# Patient Record
Sex: MALE | NOT HISPANIC OR LATINO | Employment: OTHER | URBAN - METROPOLITAN AREA
[De-identification: names, ages, dates, MRNs, and addresses within clinical notes are randomized per-mention and may not be internally consistent; named-entity substitution may affect disease eponyms.]

---

## 2024-05-07 PROBLEM — I65.29 CAROTID ARTERY STENOSIS: Status: ACTIVE | Noted: 2024-05-07

## 2024-05-31 ENCOUNTER — HOSPITAL ENCOUNTER (OUTPATIENT)
Facility: HOSPITAL | Age: 79
Discharge: HOME | End: 2024-05-31
Payer: MEDICARE

## 2024-05-31 ENCOUNTER — OFFICE VISIT (OUTPATIENT)
Age: 79
End: 2024-05-31
Payer: MEDICARE

## 2024-05-31 VITALS
HEIGHT: 71 IN | SYSTOLIC BLOOD PRESSURE: 142 MMHG | DIASTOLIC BLOOD PRESSURE: 66 MMHG | WEIGHT: 203 LBS | BODY MASS INDEX: 28.42 KG/M2

## 2024-05-31 DIAGNOSIS — I65.23 BILATERAL CAROTID ARTERY STENOSIS: Primary | ICD-10-CM

## 2024-05-31 DIAGNOSIS — I65.23 CAROTID STENOSIS, BILATERAL: ICD-10-CM

## 2024-05-31 DIAGNOSIS — I65.23 BILATERAL CAROTID ARTERY STENOSIS: ICD-10-CM

## 2024-05-31 LAB
BH CV XLRA MEAS LEFT CAROTID BULB EDV: 36.4 CM/SEC
BH CV XLRA MEAS LEFT CAROTID BULB PSV: 142.1 CM/SEC
BH CV XLRA MEAS LEFT DIST CCA EDV: -32.9 CM/SEC
BH CV XLRA MEAS LEFT DIST CCA PSV: -134.3 CM/SEC
BH CV XLRA MEAS LEFT DIST ICA EDV: -39.2 CM/SEC
BH CV XLRA MEAS LEFT DIST ICA PSV: -112.9 CM/SEC
BH CV XLRA MEAS LEFT ICA/CCA RATIO: 1.08
BH CV XLRA MEAS LEFT MID CCA EDV: 34.7 CM/SEC
BH CV XLRA MEAS LEFT MID CCA PSV: 143 CM/SEC
BH CV XLRA MEAS LEFT MID ICA EDV: -41.5 CM/SEC
BH CV XLRA MEAS LEFT MID ICA PSV: -145 CM/SEC
BH CV XLRA MEAS LEFT PROX CCA EDV: 34.7 CM/SEC
BH CV XLRA MEAS LEFT PROX CCA PSV: 157.7 CM/SEC
BH CV XLRA MEAS LEFT PROX ECA EDV: -21.7 CM/SEC
BH CV XLRA MEAS LEFT PROX ECA PSV: -109.2 CM/SEC
BH CV XLRA MEAS LEFT PROX ICA EDV: -23.5 CM/SEC
BH CV XLRA MEAS LEFT PROX ICA PSV: -99.6 CM/SEC
BH CV XLRA MEAS LEFT PROX SCLA PSV: 222.3 CM/SEC
BH CV XLRA MEAS LEFT VERTEBRAL A EDV: -14.9 CM/SEC
BH CV XLRA MEAS LEFT VERTEBRAL A PSV: -54.1 CM/SEC
BH CV XLRA MEAS RIGHT CAROTID BULB EDV: -42.8 CM/SEC
BH CV XLRA MEAS RIGHT CAROTID BULB PSV: -180 CM/SEC
BH CV XLRA MEAS RIGHT DIST CCA EDV: 36.6 CM/SEC
BH CV XLRA MEAS RIGHT DIST CCA PSV: 170.7 CM/SEC
BH CV XLRA MEAS RIGHT DIST ICA EDV: -37.1 CM/SEC
BH CV XLRA MEAS RIGHT DIST ICA PSV: -111.4 CM/SEC
BH CV XLRA MEAS RIGHT ICA/CCA RATIO: 1.05
BH CV XLRA MEAS RIGHT MID CCA EDV: 32.9 CM/SEC
BH CV XLRA MEAS RIGHT MID CCA PSV: 149.1 CM/SEC
BH CV XLRA MEAS RIGHT MID ICA EDV: -32.9 CM/SEC
BH CV XLRA MEAS RIGHT MID ICA PSV: -104.4 CM/SEC
BH CV XLRA MEAS RIGHT PROX CCA EDV: 29 CM/SEC
BH CV XLRA MEAS RIGHT PROX CCA PSV: 131.7 CM/SEC
BH CV XLRA MEAS RIGHT PROX ECA EDV: -14.9 CM/SEC
BH CV XLRA MEAS RIGHT PROX ECA PSV: -118.4 CM/SEC
BH CV XLRA MEAS RIGHT PROX ICA EDV: -39.5 CM/SEC
BH CV XLRA MEAS RIGHT PROX ICA PSV: -158 CM/SEC
BH CV XLRA MEAS RIGHT PROX SCLA PSV: 110.9 CM/SEC
BH CV XLRA MEAS RIGHT VERTEBRAL A EDV: -22.1 CM/SEC
BH CV XLRA MEAS RIGHT VERTEBRAL A PSV: -84.5 CM/SEC
LEFT ARM BP: NORMAL MMHG
RIGHT ARM BP: NORMAL MMHG

## 2024-05-31 PROCEDURE — 93880 EXTRACRANIAL BILAT STUDY: CPT

## 2024-05-31 RX ORDER — IRBESARTAN 300 MG/1
TABLET ORAL
COMMUNITY
Start: 2024-05-28

## 2024-05-31 RX ORDER — AMLODIPINE BESYLATE 2.5 MG/1
2.5 TABLET ORAL DAILY
COMMUNITY
Start: 2024-03-15

## 2024-05-31 RX ORDER — UBIDECARENONE 100 MG
200 CAPSULE ORAL DAILY
COMMUNITY

## 2024-05-31 NOTE — PATIENT INSTRUCTIONS
"\"0-5-3-Almost None!\"  Healthy Habits Start Early    EAT 5 OR MORE SERVINGS OF VEGETABLES AND FRUITS EVERY DAY.    Help Te get three vegetables and two fruits each day. Red, green, yellow, orange...encourage them to try all the colors so they can enjoy different flavors and get more vitamins.    How can I help Te do this?  ---------------------------------------------  -BE PATIENT WITH Te, remember it may take 10 times before they start to like new food. So, start with small bites and just keep trying.  -Serve at least one vegetable or fruit at every meal. Even try two. Remember, portions do not have to be as big as you think.  -Encourage eating fruits and vegetables instead of drinking them..it's a better way to get fiber and vitamins..so limit the amount of juice to 1/2 cup per day for children 1-6 years and one cup per day for children 7-18 years of age. Try using 1/2 part water and 1/2 part juice.    Spend less than two hours per day watching television and other screen media. Screen media includes video games, movies and computer use for entertainment.    How can I help Te do this?  -Turn off the TV at dinner. Dinner is the best time to hang out with your kids and just talk, learn about their day, and tell them about your day. Your kids have a lot to learn from you and dinner is a great time to share.  "

## 2024-05-31 NOTE — PROGRESS NOTES
Chief Complaint  Carotid Artery Disease    Subjective        Te James presents to [facility] VASCULAR SURGERY  HPI   Te James is a 78 y.o. male that has been followed in our office Dr. Haley for carotid artery stenosis. He returns today in follow up along with a carotid duplex. He  reports he has been doing well without hospitalizations or surgeries. He denies any symptoms consistent with CVA, TIA, or amaurosis fugax.     Review of Systems   Constitutional:  Negative for fever.   Eyes:  Negative for visual disturbance.   Cardiovascular:  Negative for leg swelling.   Gastrointestinal:  Negative for abdominal pain.   Musculoskeletal:  Negative for back pain.   Skin:  Negative for color change, pallor and wound.   Neurological:  Negative for dizziness, facial asymmetry, speech difficulty and weakness.        Te James  reports that he has quit smoking. His smoking use included cigarettes. He started smoking about 52 years ago. He does not have any smokeless tobacco history on file..        Objective   Vital Signs:  Vitals:    05/31/24 1157   BP: 142/66      Body mass index is 28.31 kg/m².   BMI is >= 25 and <30. (Overweight) The following options were offered after discussion;: information on healthy weight added to patient's after visit summary      Physical Exam  Vitals reviewed.   Constitutional:       Appearance: Normal appearance.   HENT:      Head: Normocephalic.   Cardiovascular:      Rate and Rhythm: Normal rate and regular rhythm.      Pulses: Normal pulses.           Dorsalis pedis pulses are 3+ on the right side and 3+ on the left side.        Posterior tibial pulses are 3+ on the right side and 3+ on the left side.   Pulmonary:      Effort: Pulmonary effort is normal.   Skin:     General: Skin is warm.   Neurological:      General: No focal deficit present.      Mental Status: He is alert and oriented to person, place, and time.   Psychiatric:         Mood and Affect:  Mood normal.          Result Review :      Previous carotid duplex: 50 to 69% stenosis on the right and a less than 50% stenosis on the left    Carotid duplex from today: Duplex Carotid Ultrasound CAR (05/31/2024 11:48)                    Assessment and Plan     Diagnoses and all orders for this visit:    1. Bilateral carotid artery stenosis (Primary)    2. Carotid stenosis, bilateral  -     Duplex Carotid Ultrasound CAR; Future             Patient present today for follow up of carotid artery stenosis. He is to continue his antiplatelet agent which is aspirin. He is on a statin for cholesterol control.  We discussed adequate blood pressure control. He will return in 1 year along with a repeat carotid artery duplex.    Follow Up     Return in about 1 year (around 5/31/2025) for carotid duplex.  Patient was given instructions and counseling regarding his condition or for health maintenance advice. Please see specific information pulled into the AVS if appropriate.     SIXTO Sal

## 2025-01-07 DIAGNOSIS — I65.23 CAROTID STENOSIS, BILATERAL: Primary | ICD-10-CM

## 2025-06-06 ENCOUNTER — HOSPITAL ENCOUNTER (OUTPATIENT)
Facility: HOSPITAL | Age: 80
Discharge: HOME | End: 2025-06-06
Payer: MEDICARE

## 2025-06-06 ENCOUNTER — OFFICE VISIT (OUTPATIENT)
Age: 80
End: 2025-06-06
Payer: MEDICARE

## 2025-06-06 VITALS
HEIGHT: 71 IN | DIASTOLIC BLOOD PRESSURE: 60 MMHG | BODY MASS INDEX: 29.15 KG/M2 | WEIGHT: 208.2 LBS | SYSTOLIC BLOOD PRESSURE: 122 MMHG

## 2025-06-06 DIAGNOSIS — I83.893 VARICOSE VEINS OF BILATERAL LOWER EXTREMITIES WITH OTHER COMPLICATIONS: ICD-10-CM

## 2025-06-06 DIAGNOSIS — I65.23 CAROTID STENOSIS, BILATERAL: ICD-10-CM

## 2025-06-06 DIAGNOSIS — I65.23 BILATERAL CAROTID ARTERY STENOSIS: Primary | ICD-10-CM

## 2025-06-06 LAB
BH CV XLRA MEAS LEFT CAROTID BULB EDV: -21.8 CM/SEC
BH CV XLRA MEAS LEFT CAROTID BULB PSV: -110.9 CM/SEC
BH CV XLRA MEAS LEFT DIST CCA EDV: 24.7 CM/SEC
BH CV XLRA MEAS LEFT DIST CCA PSV: 135.6 CM/SEC
BH CV XLRA MEAS LEFT DIST ICA EDV: -34.1 CM/SEC
BH CV XLRA MEAS LEFT DIST ICA PSV: -107.2 CM/SEC
BH CV XLRA MEAS LEFT ICA/CCA RATIO: -1.01
BH CV XLRA MEAS LEFT MID CCA EDV: 24.7 CM/SEC
BH CV XLRA MEAS LEFT MID CCA PSV: 129 CM/SEC
BH CV XLRA MEAS LEFT MID ICA EDV: -30.3 CM/SEC
BH CV XLRA MEAS LEFT MID ICA PSV: -113.8 CM/SEC
BH CV XLRA MEAS LEFT PROX CCA EDV: 28.4 CM/SEC
BH CV XLRA MEAS LEFT PROX CCA PSV: 162.4 CM/SEC
BH CV XLRA MEAS LEFT PROX ECA EDV: -16.1 CM/SEC
BH CV XLRA MEAS LEFT PROX ECA PSV: -134.7 CM/SEC
BH CV XLRA MEAS LEFT PROX ICA EDV: -31.3 CM/SEC
BH CV XLRA MEAS LEFT PROX ICA PSV: -136.6 CM/SEC
BH CV XLRA MEAS LEFT PROX SCLA PSV: -192.8 CM/SEC
BH CV XLRA MEAS LEFT VERTEBRAL A EDV: 18.4 CM/SEC
BH CV XLRA MEAS LEFT VERTEBRAL A PSV: 72.2 CM/SEC
BH CV XLRA MEAS RIGHT CAROTID BULB EDV: -30.8 CM/SEC
BH CV XLRA MEAS RIGHT CAROTID BULB PSV: -176.6 CM/SEC
BH CV XLRA MEAS RIGHT DIST CCA EDV: 32 CM/SEC
BH CV XLRA MEAS RIGHT DIST CCA PSV: 180.2 CM/SEC
BH CV XLRA MEAS RIGHT DIST ICA EDV: -37 CM/SEC
BH CV XLRA MEAS RIGHT DIST ICA PSV: -115.7 CM/SEC
BH CV XLRA MEAS RIGHT ICA/CCA RATIO: -0.94
BH CV XLRA MEAS RIGHT MID CCA EDV: 30.8 CM/SEC
BH CV XLRA MEAS RIGHT MID CCA PSV: 199.1 CM/SEC
BH CV XLRA MEAS RIGHT MID ICA EDV: -34.1 CM/SEC
BH CV XLRA MEAS RIGHT MID ICA PSV: -110.9 CM/SEC
BH CV XLRA MEAS RIGHT PROX CCA EDV: 24.9 CM/SEC
BH CV XLRA MEAS RIGHT PROX CCA PSV: 157.7 CM/SEC
BH CV XLRA MEAS RIGHT PROX ECA EDV: -10.7 CM/SEC
BH CV XLRA MEAS RIGHT PROX ECA PSV: -136.3 CM/SEC
BH CV XLRA MEAS RIGHT PROX ICA EDV: -33.2 CM/SEC
BH CV XLRA MEAS RIGHT PROX ICA PSV: -169.5 CM/SEC
BH CV XLRA MEAS RIGHT PROX SCLA PSV: 139.9 CM/SEC
BH CV XLRA MEAS RIGHT VERTEBRAL A EDV: 17.7 CM/SEC
BH CV XLRA MEAS RIGHT VERTEBRAL A PSV: 82.1 CM/SEC
LEFT ARM BP: NORMAL MMHG
RIGHT ARM BP: NORMAL MMHG

## 2025-06-06 PROCEDURE — 93880 EXTRACRANIAL BILAT STUDY: CPT

## 2025-06-06 NOTE — PATIENT INSTRUCTIONS
"\"0-2-7-Almost None!\"  Healthy Habits Start Early    EAT 5 OR MORE SERVINGS OF VEGETABLES AND FRUITS EVERY DAY.    Help Te get three vegetables and two fruits each day. Red, green, yellow, orange...encourage them to try all the colors so they can enjoy different flavors and get more vitamins.    How can I help Te do this?  ---------------------------------------------  -BE PATIENT WITH Te, remember it may take 10 times before they start to like new food. So, start with small bites and just keep trying.  -Serve at least one vegetable or fruit at every meal. Even try two. Remember, portions do not have to be as big as you think.  -Encourage eating fruits and vegetables instead of drinking them..it's a better way to get fiber and vitamins..so limit the amount of juice to 1/2 cup per day for children 1-6 years and one cup per day for children 7-18 years of age. Try using 1/2 part water and 1/2 part juice.    Spend less than two hours per day watching television and other screen media. Screen media includes video games, movies and computer use for entertainment.    How can I help Te do this?  -Turn off the TV at dinner. Dinner is the best time to hang out with your kids and just talk, learn about their day, and tell them about your day. Your kids have a lot to learn from you and dinner is a great time to share.  "

## 2025-06-06 NOTE — PROGRESS NOTES
Chief Complaint  Carotid Artery Disease    Subjective        Te James presents to [facility] VASCULAR SURGERY  HPI   Te James is a 79 y.o. male that has been followed in our office Dr. Haley for carotid artery stenosis. He returns today in follow up along with a carotid duplex. He  reports he has been doing well without hospitalizations or surgeries. He denies any symptoms consistent with CVA, TIA, or amaurosis fugax. He is asking about evaluation of his varicose veins. These are bilateral with the left leg being worse than the right. He denies any pain, swelling, itching, or aching to his legs. He does not wear compression stockings on a consistent basis, only when he travels.     Review of Systems   Constitutional:  Negative for fever.   Eyes:  Negative for visual disturbance.   Cardiovascular:  Negative for leg swelling.   Gastrointestinal:  Negative for abdominal pain.   Musculoskeletal:  Negative for back pain.   Skin:  Negative for color change, pallor and wound.   Neurological:  Negative for dizziness, facial asymmetry, speech difficulty and weakness.        Te James  reports that he has quit smoking. His smoking use included cigarettes. He started smoking about 53 years ago. He has been exposed to tobacco smoke. He does not have any smokeless tobacco history on file..        Objective   Vital Signs:  Vitals:    06/06/25 1126   BP: 122/60        Body mass index is 29.04 kg/m².   BMI is >= 25 and <30. (Overweight) The following options were offered after discussion;: information on healthy weight added to patient's after visit summary      Physical Exam  Vitals reviewed.   Constitutional:       Appearance: Normal appearance.   HENT:      Head: Normocephalic.   Cardiovascular:      Rate and Rhythm: Normal rate and regular rhythm.      Pulses:           Dorsalis pedis pulses are 3+ on the right side and 3+ on the left side.        Posterior tibial pulses are 3+ on the right  side and 3+ on the left side.   Pulmonary:      Effort: Pulmonary effort is normal.   Skin:     General: Skin is warm.   Neurological:      General: No focal deficit present.      Mental Status: He is alert and oriented to person, place, and time.   Psychiatric:         Mood and Affect: Mood normal.          Result Review :      Previous carotid duplex: Duplex Carotid Ultrasound CAR (05/31/2024 11:48)     Carotid duplex from today: Duplex Carotid Ultrasound CAR (06/06/2025 11:20)                    Assessment and Plan     Diagnoses and all orders for this visit:    1. Bilateral carotid artery stenosis (Primary)    2. Carotid stenosis, bilateral  -     Duplex Carotid Ultrasound CAR; Future    3. Varicose veins of bilateral lower extremities with other complications               Patient present today for follow up of carotid artery stenosis.  Today, he has a less than 50% stenosis bilaterally.  This is unchanged from previous imaging.  He is to continue his antiplatelet agent which is aspirin. He is on a statin for cholesterol control.  We discussed adequate blood pressure control. He will return in 1 year along with a repeat carotid artery duplex. In regards to his varicose veins, we discussed medical and surgical management.  He is not having any significant symptoms therefore have not recommended surgical management or pursuing a vein mapping.  We did discuss the use of daily compression stockings, though he did not want to do this.    Follow Up     Return in about 1 year (around 6/6/2026) for carotid duplex.  Patient was given instructions and counseling regarding his condition or for health maintenance advice. Please see specific information pulled into the AVS if appropriate.     SIXTO Sal